# Patient Record
Sex: FEMALE | Race: BLACK OR AFRICAN AMERICAN | Employment: FULL TIME | URBAN - METROPOLITAN AREA
[De-identification: names, ages, dates, MRNs, and addresses within clinical notes are randomized per-mention and may not be internally consistent; named-entity substitution may affect disease eponyms.]

---

## 2023-11-12 ENCOUNTER — HOSPITAL ENCOUNTER (EMERGENCY)
Age: 43
Discharge: HOME | End: 2023-11-12
Payer: COMMERCIAL

## 2023-11-12 VITALS
OXYGEN SATURATION: 100 % | TEMPERATURE: 98.6 F | WEIGHT: 169 LBS | DIASTOLIC BLOOD PRESSURE: 95 MMHG | HEART RATE: 88 BPM | HEIGHT: 67 IN | RESPIRATION RATE: 15 BRPM | BODY MASS INDEX: 26.53 KG/M2 | SYSTOLIC BLOOD PRESSURE: 130 MMHG

## 2023-11-12 DIAGNOSIS — K08.89 PAIN, DENTAL: Primary | ICD-10-CM

## 2023-11-12 PROCEDURE — 6370000000 HC RX 637 (ALT 250 FOR IP): Performed by: NURSE PRACTITIONER

## 2023-11-12 PROCEDURE — 99283 EMERGENCY DEPT VISIT LOW MDM: CPT

## 2023-11-12 RX ORDER — AMOXICILLIN 500 MG/1
500 CAPSULE ORAL 3 TIMES DAILY
Qty: 30 CAPSULE | Refills: 0 | Status: SHIPPED | OUTPATIENT
Start: 2023-11-12 | End: 2023-11-22

## 2023-11-12 RX ORDER — BUSPIRONE HYDROCHLORIDE 15 MG/1
15 TABLET ORAL 2 TIMES DAILY
COMMUNITY
Start: 2022-12-05

## 2023-11-12 RX ORDER — HYDROCODONE BITARTRATE AND ACETAMINOPHEN 5; 325 MG/1; MG/1
1 TABLET ORAL
Status: COMPLETED | OUTPATIENT
Start: 2023-11-12 | End: 2023-11-12

## 2023-11-12 RX ORDER — TRAMADOL HYDROCHLORIDE 50 MG/1
50 TABLET ORAL EVERY 6 HOURS PRN
Qty: 12 TABLET | Refills: 0 | Status: SHIPPED | OUTPATIENT
Start: 2023-11-12 | End: 2023-11-15

## 2023-11-12 RX ADMIN — HYDROCODONE BITARTRATE AND ACETAMINOPHEN 1 TABLET: 5; 325 TABLET ORAL at 14:41

## 2023-11-12 ASSESSMENT — PAIN SCALES - GENERAL
PAINLEVEL_OUTOF10: 9
PAINLEVEL_OUTOF10: 10

## 2023-11-12 ASSESSMENT — PAIN - FUNCTIONAL ASSESSMENT: PAIN_FUNCTIONAL_ASSESSMENT: 0-10

## 2023-11-12 NOTE — ED TRIAGE NOTES
Pt ambulatory to ED with mom with c/o upper right dental pain since last week with worsening pain last night.

## 2023-11-12 NOTE — DISCHARGE INSTRUCTIONS
Take medication as prescribed. Please follow-up with your dentist.  Return to the emergency department for any new, worsening, or concerning symptoms.

## 2023-11-12 NOTE — ED PROVIDER NOTES
Emergency Department Provider Note       PCP: Regina July, DEREK - REILLY   Age: 44 y.o. Sex: female     DISPOSITION Decision To Discharge 11/12/2023 02:26:00 PM       ICD-10-CM    1. Pain, dental  K08.89 traMADol (ULTRAM) 50 MG tablet          Medical Decision Making     Complexity of Problems Addressed:  Complexity of Problem: 1 acute, uncomplicated illness or injury. Data Reviewed and Analyzed:  I independently ordered and reviewed each unique test.             Discussion of management or test interpretation. Well-appearing 81-year-old female presents emergency department today with complaint of right upper dental pain. Patient appears in no acute distress. She does have right upper dental tenderness on exam.  There is no drainable abscess. Patient reports she has had multiple surgeries on her teeth in the past.  She currently has braces. Through shared decision making, will cover for possible infection with amoxicillin. We will treat for acute pain. Encouraged her to call her dentist on Monday to schedule follow-up. Risk of Complications and/or Morbidity of Patient Management:  Prescription drug management performed and Shared medical decision making was utilized in creating the patients health plan today. History      81-year-old female presents emergency department today with complaint of right upper dental pain. Patient states that pain began last week but progressively worsened over the past couple of days. She denies any difficulty swallowing, fever, chills, chest pain, abdominal pain, shortness of breath. She has tried over-the-counter Tylenol and ibuprofen without any relief at all. She states that because of the pain, she is unable to sleep at night. She does have a dentist but has not yet called them. The history is provided by the patient. Physical Exam     Vitals signs and nursing note reviewed.    Vitals:    11/12/23 1331   BP: (!) 130/95   Pulse: 88   Resp: 15